# Patient Record
Sex: FEMALE | Race: WHITE | HISPANIC OR LATINO | Employment: FULL TIME | URBAN - METROPOLITAN AREA
[De-identification: names, ages, dates, MRNs, and addresses within clinical notes are randomized per-mention and may not be internally consistent; named-entity substitution may affect disease eponyms.]

---

## 2023-04-07 ENCOUNTER — TELEPHONE (OUTPATIENT)
Dept: FAMILY MEDICINE CLINIC | Facility: CLINIC | Age: 48
End: 2023-04-07

## 2023-04-07 ENCOUNTER — OFFICE VISIT (OUTPATIENT)
Dept: FAMILY MEDICINE CLINIC | Facility: CLINIC | Age: 48
End: 2023-04-07

## 2023-04-07 ENCOUNTER — APPOINTMENT (OUTPATIENT)
Dept: LAB | Age: 48
End: 2023-04-07

## 2023-04-07 VITALS
OXYGEN SATURATION: 100 % | TEMPERATURE: 98.3 F | DIASTOLIC BLOOD PRESSURE: 84 MMHG | BODY MASS INDEX: 25.82 KG/M2 | HEART RATE: 78 BPM | SYSTOLIC BLOOD PRESSURE: 140 MMHG | RESPIRATION RATE: 18 BRPM | HEIGHT: 58 IN | WEIGHT: 123 LBS

## 2023-04-07 DIAGNOSIS — D64.9 ANEMIA, UNSPECIFIED TYPE: ICD-10-CM

## 2023-04-07 DIAGNOSIS — Z11.4 SCREENING FOR HIV (HUMAN IMMUNODEFICIENCY VIRUS): ICD-10-CM

## 2023-04-07 DIAGNOSIS — Z13.1 SCREENING FOR DIABETES MELLITUS: ICD-10-CM

## 2023-04-07 DIAGNOSIS — Z13.6 SCREENING FOR CARDIOVASCULAR CONDITION: ICD-10-CM

## 2023-04-07 DIAGNOSIS — Z11.59 NEED FOR HEPATITIS C SCREENING TEST: ICD-10-CM

## 2023-04-07 DIAGNOSIS — Z80.0 FAMILY HISTORY OF COLON CANCER: ICD-10-CM

## 2023-04-07 DIAGNOSIS — Z11.1 SCREENING FOR TUBERCULOSIS: ICD-10-CM

## 2023-04-07 DIAGNOSIS — Z12.31 ENCOUNTER FOR SCREENING MAMMOGRAM FOR BREAST CANCER: ICD-10-CM

## 2023-04-07 DIAGNOSIS — Z00.00 ANNUAL PHYSICAL EXAM: Primary | ICD-10-CM

## 2023-04-07 PROBLEM — D50.0 IRON DEFICIENCY ANEMIA DUE TO CHRONIC BLOOD LOSS: Status: RESOLVED | Noted: 2018-05-23 | Resolved: 2023-04-07

## 2023-04-07 PROBLEM — M25.512 ACUTE PAIN OF LEFT SHOULDER: Status: RESOLVED | Noted: 2019-01-24 | Resolved: 2023-04-07

## 2023-04-07 LAB
ALBUMIN SERPL BCP-MCNC: 4.6 G/DL (ref 3.5–5)
ALP SERPL-CCNC: 58 U/L (ref 46–116)
ALT SERPL W P-5'-P-CCNC: 31 U/L (ref 12–78)
ANION GAP SERPL CALCULATED.3IONS-SCNC: 3 MMOL/L (ref 4–13)
AST SERPL W P-5'-P-CCNC: 28 U/L (ref 5–45)
BILIRUB SERPL-MCNC: 0.51 MG/DL (ref 0.2–1)
BUN SERPL-MCNC: 15 MG/DL (ref 5–25)
CALCIUM SERPL-MCNC: 9.2 MG/DL (ref 8.3–10.1)
CHLORIDE SERPL-SCNC: 104 MMOL/L (ref 96–108)
CHOLEST SERPL-MCNC: 218 MG/DL
CO2 SERPL-SCNC: 26 MMOL/L (ref 21–32)
CREAT SERPL-MCNC: 0.72 MG/DL (ref 0.6–1.3)
ERYTHROCYTE [DISTWIDTH] IN BLOOD BY AUTOMATED COUNT: 12.7 % (ref 11.6–15.1)
FERRITIN SERPL-MCNC: 15 NG/ML (ref 8–388)
GFR SERPL CREATININE-BSD FRML MDRD: 101 ML/MIN/1.73SQ M
GLUCOSE P FAST SERPL-MCNC: 80 MG/DL (ref 65–99)
HCT VFR BLD AUTO: 45.5 % (ref 34.8–46.1)
HDLC SERPL-MCNC: 66 MG/DL
HGB BLD-MCNC: 14.1 G/DL (ref 11.5–15.4)
IRON SATN MFR SERPL: 27 % (ref 15–50)
IRON SERPL-MCNC: 122 UG/DL (ref 50–170)
LDLC SERPL CALC-MCNC: 135 MG/DL (ref 0–100)
MCH RBC QN AUTO: 30.2 PG (ref 26.8–34.3)
MCHC RBC AUTO-ENTMCNC: 31 G/DL (ref 31.4–37.4)
MCV RBC AUTO: 97 FL (ref 82–98)
NONHDLC SERPL-MCNC: 152 MG/DL
PLATELET # BLD AUTO: 254 THOUSANDS/UL (ref 149–390)
PMV BLD AUTO: 12.2 FL (ref 8.9–12.7)
POTASSIUM SERPL-SCNC: 4.1 MMOL/L (ref 3.5–5.3)
PROT SERPL-MCNC: 8.8 G/DL (ref 6.4–8.4)
RBC # BLD AUTO: 4.67 MILLION/UL (ref 3.81–5.12)
SODIUM SERPL-SCNC: 133 MMOL/L (ref 135–147)
TIBC SERPL-MCNC: 451 UG/DL (ref 250–450)
TRIGL SERPL-MCNC: 86 MG/DL
WBC # BLD AUTO: 5.68 THOUSAND/UL (ref 4.31–10.16)

## 2023-04-07 NOTE — ASSESSMENT & PLAN NOTE
41year old female presenting for annual physical  No current complaints - needs forms for work completed  PHQ-2 score: negative score of 0  Screenings:   - Colonoscopy - sister has colon cancer, diagnosed at age 44-42 years old  Will provide referral today   - Mammogram - Ordered   - Pap smear - last Pap in 2018, negative  Following with OBGYN   - Labs:  Will order: HIV, Hep C, CBC, CMP, FLP, Iron panel, Quantiferon    - Once Quantiferon results come back, will complete work physical forms

## 2023-04-07 NOTE — TELEPHONE ENCOUNTER
- Form in (BLUE) clinical folder     - Name of the form is: (NAME)    - Form to be filled out by: (Dr Jasmine Bhardwaj)    - Form to be:  picked up (PATIENT)    - Patient made aware of 7 business day policy and verbalized understanding

## 2023-04-07 NOTE — ASSESSMENT & PLAN NOTE
Chronic history of JESSY 2/2 AUB back in 2018   Completed endometrial biopsy which returned negative, and had 2-3 polyps removed from cervix, which were also benign  Has been taking PO iron supplements  Will get CBC and Iron panel

## 2023-04-07 NOTE — PROGRESS NOTES
[address] [facility]    NAME: Marito HyattgoFeliberto  AGE: 41 y o  SEX: female  : 1975     DATE: 2023     Assessment and Plan:     Problem List Items Addressed This Visit        Other    Annual physical exam - Primary     41year old female presenting for annual physical  No current complaints - needs forms for work completed  PHQ-2 score: negative score of 0  Screenings:   - Colonoscopy - sister has colon cancer, diagnosed at age 44-42 years old  Will provide referral today   - Mammogram - Ordered   - Pap smear - last Pap in 2018, negative  Following with OBGYN   - Labs: Will order: HIV, Hep C, CBC, CMP, FLP, Iron panel, Quantiferon    - Once Quantiferon results come back, will complete work physical forms          Anemia     Chronic history of JESSY 2/2 AUB back in 2018   Completed endometrial biopsy which returned negative, and had 2-3 polyps removed from cervix, which were also benign  Has been taking PO iron supplements  Will get CBC and Iron panel         Relevant Orders    CBC and Platelet    Iron Panel (Includes Ferritin, Iron Sat%, Iron, and TIBC)   Other Visit Diagnoses     Encounter for screening mammogram for breast cancer        Relevant Orders    Mammo screening bilateral w 3d & cad    Family history of colon cancer        Relevant Orders    Ambulatory referral for Colonoscopy    Screening for diabetes mellitus        Relevant Orders    Comprehensive metabolic panel    Screening for cardiovascular condition        Relevant Orders    Lipid panel    Comprehensive metabolic panel    Screening for HIV (human immunodeficiency virus)        Relevant Orders    HIV 1/2 AB/AG w Reflex [facility] for 2 yr old and above    Need for hepatitis C screening test        Relevant Orders    Hepatitis C antibody    Screening for tuberculosis        Relevant Orders    Quantiferon TB Gold Plus          Immunizations and preventive care screenings were discussed with patient today  Appropriate education was printed on patient's after visit summary  Counseling:  Alcohol/drug use: discussed moderation in alcohol intake, the recommendations for healthy alcohol use, and avoidance of illicit drug use  Dental Health: discussed importance of regular tooth brushing, flossing, and dental visits  Injury prevention: discussed safety/seat belts, safety helmets, smoke detectors, carbon dioxide detectors, and smoking near bedding or upholstery  Sexual health: discussed sexually transmitted diseases, partner selection, use of condoms, avoidance of unintended pregnancy, and contraceptive alternatives  · Exercise: the importance of regular exercise/physical activity was discussed  Recommend exercise 3-5 times per week for at least 30 minutes  BMI Counseling: Body mass index is 25 36 kg/m²  The BMI is above normal  Nutrition recommendations include encouraging healthy choices of fruits and vegetables, moderation in carbohydrate intake and increasing intake of lean protein  Exercise recommendations include moderate physical activity 150 minutes/week and exercising 3-5 times per week  No pharmacotherapy was ordered  Rationale for BMI follow-up plan is due to patient being overweight or obese  Return if symptoms worsen or fail to improve  Chief Complaint:     Chief Complaint   Patient presents with   • Employment Physical     Patient need TB labs  No skin test      History of Present Illness:     Adult Annual Physical    Patient here for a comprehensive physical exam  The patient reports no problems  She needs to complete the physical for work and requests a Quantiferon test      Diet and Physical Activity  · Diet/Nutrition: overall good diet but does not eat much meat  Eats eggs for protein  · Exercise: walking and 5-7 times a week on average        Depression Screening  PHQ-2/9 Depression Screening    Little interest or pleasure in doing things: 0 - not at all  Feeling down, depressed, or hopeless: 0 - not at all  PHQ-2 Score: 0  PHQ-2 Interpretation: Negative depression screen       General Health  · Sleep: sleeps well and gets 7-8 hours of sleep on average  · Hearing: normal - bilateral   · Vision: no vision problems  · Dental: no dental visits for >1 year, brushes teeth twice daily and flosses teeth occasionally  /GYN Health  · Patient is: premenopausal  · Last menstrual period: 4/04/23 (irregular, usually 7 days but period in March lasted 15 days)  · Contraceptive method: None  · Upcoming OBGYN appt next month   · History of AUB back in 2018, endometrial biopsy (2018) returned negative  Found to have 2-3 cervical polyps which were removed (benign)  Had been on OCPs to help manage menorrhagia but this was finished in 2018 and her bleeding had improved by then  Review of Systems:     Review of Systems   Constitutional: Negative for chills and fever  HENT: Negative for ear pain and sore throat  Eyes: Negative for pain and visual disturbance  Respiratory: Negative for cough and shortness of breath  Cardiovascular: Negative for chest pain and palpitations  Gastrointestinal: Negative for abdominal pain and vomiting  Genitourinary: Negative for dysuria and hematuria  Musculoskeletal: Negative for arthralgias and back pain  Skin: Negative for color change and rash  Neurological: Negative for seizures and syncope  All other systems reviewed and are negative       Past Medical History:     Past Medical History:   Diagnosis Date   • Anemia    • Cervical polyp    • Depression     LAST ASSESSED: [id number]   • Fatigue    • Intermittent asthma     LAST ASSESSED: [id number]   • Restless legs syndrome     LAST ASSESSED: [id number]      Past Surgical History:     Past Surgical History:   Procedure Laterality Date   • CERVICAL POLYPECTOMY N/A       Social History:     Social History     Socioeconomic History   • Marital status: /Civil Union     Spouse name: None   • Number of children: None   • Years of education: None   • Highest education level: None   Occupational History   • None   Tobacco Use   • Smoking status: Never   • Smokeless tobacco: Never   Vaping Use   • Vaping Use: Never used   Substance and Sexual Activity   • Alcohol use: No   • Drug use: No   • Sexual activity: Yes     Partners: Male   Other Topics Concern   • None   Social History Narrative   • None     Social Determinants of Health     Financial Resource Strain: Low Risk    • Difficulty of Paying Living Expenses: Not hard at all   Food Insecurity: No Food Insecurity   • Worried About Running Out of Food in the Last Year: Never true   • Ran Out of Food in the Last Year: Never true   Transportation Needs: No Transportation Needs   • Lack of Transportation (Medical): No   • Lack of Transportation (Non-Medical): No   Physical Activity: Inactive   • Days of Exercise per Week: 0 days   • Minutes of Exercise per Session: 0 min   Stress: No Stress Concern Present   • Feeling of Stress : Not at all   Social Connections:  Moderately Isolated   • Frequency of Communication with Friends and Family: More than three times a week   • Frequency of Social Gatherings with Friends and Family: More than three times a week   • Attends Jewish Services: Never   • Active Member of Clubs or Organizations: No   • Attends Club or Organization Meetings: Never   • Marital Status:    Intimate Partner Violence: Not At Risk   • Fear of Current or Ex-Partner: No   • Emotionally Abused: No   • Physically Abused: No   • Sexually Abused: No   Housing Stability: Low Risk    • Unable to Pay for Housing in the Last Year: No   • Number of Places Lived in the Last Year: 1   • Unstable Housing in the Last Year: No      Family History:     Family History   Problem Relation Age of Onset   • Hypertension Father    • Kidney cancer Sister    • Colon cancer Sister    • Lupus Sister "  • Diabetes type II Maternal Grandmother    • Diabetes Mother    • Kidney disease Paternal Grandmother       Current Medications:     Current Outpatient Medications   Medication Sig Dispense Refill   • ferrous sulfate 324 (65 Fe) mg Take 1 tablet (324 mg total) by mouth 2 (two) times a day before meals (Patient not taking: Reported on 4/7/2023) 30 tablet 0   • loratadine (CLARITIN) 10 mg tablet Take 1 tablet (10 mg total) by mouth daily (Patient not taking: Reported on 4/7/2023) 30 tablet 0   • Multiple Vitamins-Minerals (DAILY MULTI PO) Take by mouth (Patient not taking: Reported on 4/7/2023)       No current facility-administered medications for this visit  Allergies:     No Known Allergies   Physical Exam:     /84 (BP Location: Left arm, Patient Position: Sitting, Cuff Size: Standard)   Pulse 78   Temp 98 3 °F (36 8 °C) (Temporal)   Resp 18   Ht 4' 10 4\" (1 483 m)   Wt 55 8 kg (123 lb)   SpO2 100%   BMI 25 36 kg/m²     Physical Exam  Vitals reviewed  Constitutional:       General: She is not in acute distress  Appearance: She is well-developed  She is not ill-appearing  HENT:      Head: Normocephalic and atraumatic  Nose: Nose normal       Mouth/Throat:      Mouth: Mucous membranes are moist       Pharynx: Oropharynx is clear  No oropharyngeal exudate  Eyes:      Extraocular Movements: Extraocular movements intact  Conjunctiva/sclera: Conjunctivae normal    Cardiovascular:      Rate and Rhythm: Normal rate and regular rhythm  Heart sounds: Normal heart sounds  No murmur heard  Pulmonary:      Effort: Pulmonary effort is normal  No respiratory distress  Breath sounds: Normal breath sounds  Abdominal:      General: Abdomen is flat  Bowel sounds are normal  There is no distension  Palpations: Abdomen is soft  Tenderness: There is no abdominal tenderness  Musculoskeletal:         General: No swelling  Normal range of motion        Cervical back: Neck " supple  Skin:     General: Skin is warm and dry  Capillary Refill: Capillary refill takes less than 2 seconds  Neurological:      Mental Status: She is alert and oriented to person, place, and time     Psychiatric:         Mood and Affect: Mood normal           Blas Mckeon MD  [address]

## 2023-04-07 NOTE — TELEPHONE ENCOUNTER
Form was not completed at appt time because patient need to complete labs for Quantiferon TB Gold Plus  Form would remain in BLUE clinical folder until we receive lab results

## 2023-04-08 LAB
HCV AB SER QL: NORMAL
HIV 1+2 AB+HIV1 P24 AG SERPL QL IA: NORMAL
HIV 2 AB SERPL QL IA: NORMAL
HIV1 AB SERPL QL IA: NORMAL
HIV1 P24 AG SERPL QL IA: NORMAL

## 2023-04-10 NOTE — TELEPHONE ENCOUNTER
TB Gold results in process  Form was already completed and signed by Dr Carlos Pinzon  If results are negative  Results need to be printed, attached to form and contact patient for

## 2023-04-12 NOTE — TELEPHONE ENCOUNTER
Results completed and is negative  Results printed and attached to form  Placed in [facility] clerical folder for